# Patient Record
Sex: MALE | Race: WHITE | Employment: FULL TIME | URBAN - METROPOLITAN AREA
[De-identification: names, ages, dates, MRNs, and addresses within clinical notes are randomized per-mention and may not be internally consistent; named-entity substitution may affect disease eponyms.]

---

## 2018-07-18 ENCOUNTER — OFFICE VISIT (OUTPATIENT)
Dept: FAMILY MEDICINE CLINIC | Facility: CLINIC | Age: 33
End: 2018-07-18
Payer: COMMERCIAL

## 2018-07-18 VITALS
RESPIRATION RATE: 18 BRPM | OXYGEN SATURATION: 99 % | TEMPERATURE: 99 F | WEIGHT: 215 LBS | HEIGHT: 72 IN | BODY MASS INDEX: 29.12 KG/M2 | HEART RATE: 68 BPM | DIASTOLIC BLOOD PRESSURE: 62 MMHG | SYSTOLIC BLOOD PRESSURE: 132 MMHG

## 2018-07-18 DIAGNOSIS — S46.911A MUSCLE STRAIN OF RIGHT SCAPULAR REGION, INITIAL ENCOUNTER: Primary | ICD-10-CM

## 2018-07-18 PROCEDURE — 99202 OFFICE O/P NEW SF 15 MIN: CPT | Performed by: FAMILY MEDICINE

## 2018-07-18 RX ORDER — PREDNISONE 20 MG/1
TABLET ORAL
Qty: 10 TABLET | Refills: 0 | Status: SHIPPED | OUTPATIENT
Start: 2018-07-18 | End: 2018-09-07

## 2018-07-18 RX ORDER — CYCLOBENZAPRINE HCL 10 MG
10 TABLET ORAL 3 TIMES DAILY
Qty: 10 TABLET | Refills: 0 | Status: SHIPPED | OUTPATIENT
End: 2018-07-25

## 2018-07-18 NOTE — PROGRESS NOTES
Patient presents with:  Neck Pain: neck apin radiating down to rt arm with tingling and numbness with muscle spasms x 3 days        HPI:   Right upper back/shoulder has been hurting for 3 days .  Dull ache in character with sharp exacerbation with some move EOM-i.  LUNGS:CTA mariaelena. HEART:S1/S2 reg., no murmurs, clicks, gallops  SKIN:no rashes on the chest or back. Back:  Normal on inspection, no pain on palpation of the spinal and paraspinal muscles. Neuro:  Grossly intact.    Musculoskeletal: right upper ba

## 2018-07-22 RX ORDER — CYCLOBENZAPRINE HCL 10 MG
10 TABLET ORAL 3 TIMES DAILY
Qty: 30 TABLET | Refills: 1 | Status: SHIPPED | OUTPATIENT
End: 2018-09-07

## 2018-08-01 PROBLEM — M50.20 HNP (HERNIATED NUCLEUS PULPOSUS), CERVICAL: Status: ACTIVE | Noted: 2018-08-01

## 2018-08-01 PROBLEM — M54.12 CERVICAL RADICULOPATHY AT C6: Status: ACTIVE | Noted: 2018-08-01
